# Patient Record
Sex: MALE | Race: OTHER
[De-identification: names, ages, dates, MRNs, and addresses within clinical notes are randomized per-mention and may not be internally consistent; named-entity substitution may affect disease eponyms.]

---

## 2017-10-03 ENCOUNTER — HOSPITAL ENCOUNTER (OUTPATIENT)
Dept: HOSPITAL 72 - RAD | Age: 62
Discharge: HOME | End: 2017-10-03
Payer: COMMERCIAL

## 2017-10-03 DIAGNOSIS — R05: Primary | ICD-10-CM

## 2017-10-03 PROCEDURE — 71020: CPT

## 2017-10-03 PROCEDURE — 70220 X-RAY EXAM OF SINUSES: CPT

## 2017-10-03 NOTE — DIAGNOSTIC IMAGING REPORT
Indication:  Pain



Comparison:  None



Findings: 5 view paranasal sinus series obtained. The paranasal sinuses are clear.

There is no air-fluid level. There is no asymmetry. Orbital rims are symmetric.



Impression:



Negative exam

## 2019-08-28 ENCOUNTER — HOSPITAL ENCOUNTER (OUTPATIENT)
Dept: HOSPITAL 72 - RAD | Age: 64
Discharge: HOME | End: 2019-08-28
Payer: COMMERCIAL

## 2019-08-28 DIAGNOSIS — M54.5: ICD-10-CM
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
## 2019-08-28 DIAGNOSIS — M85.80: ICD-10-CM
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
## 2019-08-28 DIAGNOSIS — M47.9: ICD-10-CM

## 2019-08-28 DIAGNOSIS — S20.219D: Primary | ICD-10-CM

## 2019-08-28 DIAGNOSIS — M54.2: ICD-10-CM

## 2019-08-28 PROCEDURE — 72110 X-RAY EXAM L-2 SPINE 4/>VWS: CPT

## 2019-08-28 PROCEDURE — 72050 X-RAY EXAM NECK SPINE 4/5VWS: CPT

## 2019-08-28 PROCEDURE — 71111 X-RAY EXAM RIBS/CHEST4/> VWS: CPT

## 2019-08-28 NOTE — DIAGNOSTIC IMAGING REPORT
Indication: Back pain

 

Comparison: None

 

Findings: 3 views of the lumbar spine were obtained. 

 

Multilevel narrowing of intervertebral disks and associated endplate and facet

osteophytes are present.  No malalignment identified. The bones are osteopenic. No

acute fracture definitely seen.  

 

Impression:  Mild spondylosis.

 

No acute injury appreciated.

## 2019-08-28 NOTE — DIAGNOSTIC IMAGING REPORT
Indication: Neck Pain

 

Findings: 5 views of the cervical spine were obtained.

 

Moderate degenerative changes are present within the cervical spine. Moderate

narrowing of the C6-7 intervertebral disc with moderate endplate osteophyte formation

noted. Narrowing of the disks is mild elsewhere. Facet arthropathy is moderate within

the midportion of the cervical spine worse on the left compared to the right. There

is narrowing of the left-sided neural foramen at C3-4 C4-5 and C5-6. This is due to

combination of factors including uncovertebral arthritis and facet arthropathy. There

is no malalignment. There is no fracture. The bones appear osteopenic. There is no

soft tissue swelling.

 

IMPRESSION:

 

Degenerative disease of the cervical spine as described above. Narrowing of the left

neural foramen at C3-4, C4-5 and C5-6 noted. This may be further evaluated with MRI.

## 2019-08-28 NOTE — DIAGNOSTIC IMAGING REPORT
Indication: Chest trauma and left-sided chest pain.

 

Comparison: None

 

Findings: 

 

4  views of the chest wall obtained bilaterally for evaluation of the ribs. 

 

Bony mineralization appears normal. There is no acute fracture identified. There is

no soft tissue swelling demonstrated. The lungs are essentially clear. The

costophrenic angle are sharp. Other osseous structures visualized are unremarkable.

 

 

Impression: Negative bilateral rib series